# Patient Record
Sex: FEMALE | URBAN - METROPOLITAN AREA
[De-identification: names, ages, dates, MRNs, and addresses within clinical notes are randomized per-mention and may not be internally consistent; named-entity substitution may affect disease eponyms.]

---

## 2020-05-14 ENCOUNTER — OFFICE VISIT (OUTPATIENT)
Dept: URBAN - METROPOLITAN AREA CLINIC 58 | Facility: CLINIC | Age: 65
End: 2020-05-14
Payer: COMMERCIAL

## 2020-05-14 DIAGNOSIS — H04.123 DRY EYE SYNDROME OF BILATERAL LACRIMAL GLANDS: ICD-10-CM

## 2020-05-14 DIAGNOSIS — H52.4 PRESBYOPIA: Primary | ICD-10-CM

## 2020-05-14 PROCEDURE — 92004 COMPRE OPH EXAM NEW PT 1/>: CPT | Performed by: OPTOMETRIST

## 2020-05-14 ASSESSMENT — INTRAOCULAR PRESSURE
OS: 12
OD: 12

## 2020-05-14 ASSESSMENT — VISUAL ACUITY
OS: 20/30
OD: 20/25

## 2020-05-14 ASSESSMENT — KERATOMETRY
OS: 45.13
OD: 45.38

## 2020-05-14 NOTE — IMPRESSION/PLAN
Impression: Presbyopia: H52.4. Plan: Diagnosis discussed. SRx released, pt edu that a temporary adjustment is expected. Continue to monitor. Offered patient to see Dr. Bonita Strange to try hard contacts to see if that would help sharp her vision as desired. Patient would like to think about it and will contact Dr. Bonita Strange office if she decides she wants to try them.